# Patient Record
Sex: MALE | Race: WHITE | NOT HISPANIC OR LATINO | Employment: FULL TIME | URBAN - METROPOLITAN AREA
[De-identification: names, ages, dates, MRNs, and addresses within clinical notes are randomized per-mention and may not be internally consistent; named-entity substitution may affect disease eponyms.]

---
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
## 2019-01-11 ENCOUNTER — OFFICE VISIT (OUTPATIENT)
Dept: PEDIATRICS | Facility: CLINIC | Age: 19
End: 2019-01-11
Payer: COMMERCIAL

## 2019-01-11 VITALS
HEART RATE: 88 BPM | TEMPERATURE: 98.4 F | DIASTOLIC BLOOD PRESSURE: 62 MMHG | OXYGEN SATURATION: 98 % | BODY MASS INDEX: 21.82 KG/M2 | RESPIRATION RATE: 13 BRPM | SYSTOLIC BLOOD PRESSURE: 118 MMHG | WEIGHT: 184 LBS

## 2019-01-11 DIAGNOSIS — L24.9 IRRITANT CONTACT DERMATITIS, UNSPECIFIED TRIGGER: Primary | ICD-10-CM

## 2019-01-11 PROCEDURE — 99213 OFFICE O/P EST LOW 20 MIN: CPT | Performed by: PHYSICIAN ASSISTANT

## 2019-01-11 RX ORDER — TRIAMCINOLONE ACETONIDE 1 MG/G
CREAM TOPICAL
Qty: 30 G | Refills: 1 | Status: SHIPPED | OUTPATIENT
Start: 2019-01-11 | End: 2024-07-16

## 2019-01-11 ASSESSMENT — ENCOUNTER SYMPTOMS
VOMITING: 0
HEADACHES: 0
NAUSEA: 0
CHILLS: 0
SHORTNESS OF BREATH: 0
FOCAL WEAKNESS: 0
DIARRHEA: 0
ABDOMINAL PAIN: 0
FEVER: 0

## 2019-01-11 NOTE — PATIENT INSTRUCTIONS
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 Your skin may be red, swollen, dry, and may be cracked. Blisters may form and ooze. The rash will itch.  Contact dermatitis can form on the face and neck, backs of hands, forearms, genitals, and lower legs.  People can get contact dermatitis from lots of sources. These include:    Plants such as poison ivy, oak, or sumac    Chemicals in hair dyes and rinses, soaps, solvents, waxes, fingernail polish, and deodorants     Jewelry or watchbands made of nickel  Contact dermatitis is not passed from person to person.  Talk with your healthcare provider about what may have caused the rash. A type of allergy testing called \"patch testing\" may be used to discover what you are allergic to. You will need to avoid the source of your rash in the future to prevent it from coming back.  Treatment is done to relieve itching and prevent the rash from coming back. The rash should go away in a few days to a few weeks.  Home care  Your healthcare provider may prescribe medicine to relieve swelling and itching. Follow all instructions when using these medicines.  General care:    Avoid anything that heats up your skin, such as hot showers or baths, or direct sunlight. This can make itching worse.    Apply cold compresses to soothe your sores to help relieve your symptoms. Do this for 30 minutes 3 to 4 times a day. You can make a cold compress by soaking a cloth in cold water. Squeeze out excess water. You can add colloidal oatmeal to the water to help reduce itching. For severe itching in a small area, apply an ice pack wrapped in a thin towel. Do this for 20 minutes 3 to 4 times a day.    You can also try wet dressings. One way to do this is to " wear a wet piece of clothing under a dry one. Wear a damp shirt under a dry shirt if your upper body is affected. This can relieve itching and prevent you from scratching the affected area.    You can also help relieve large areas of itching by taking a lukewarm bath with colloidal oatmeal added to the water.    Use hydrocortisone cream for redness and irritation, unless another medicine was prescribed. You can also use benzocaine anesthetic cream or spray. Calamine lotion can also relieve mild symptoms.    Use oral diphenhydramine to help reduce itching. You can buy this antihistamine at drug and grocery stores. It can make you sleepy, so use lower doses during the daytime. Or you can use loratadine. This is an antihistamine that will not make you sleepy. Do not use diphenhydramine if you have glaucoma or have trouble urinating due to an enlarged prostate.    If a plant causes your rash, make sure to wash your skin and the clothes you were wearing when you came into contact with the plant. This is to wash away the plant oils that gave you the rash and prevent more or worse symptoms.    Stay away from the substance or object that causes your symptoms. If you can t avoid it, wear gloves or some other type of protection.  Follow-up care  Follow up with your healthcare provider, or as advised.  When to seek medical advice  Call your healthcare provider right away if any of these occur:    Spreading of the rash to other parts of your body    Severe swelling of your face, eyelids, mouth, throat or tongue    Trouble urinating due to swelling in the genital area    Fever of 100.4 F (38 C) or higher    Redness or swelling that gets worse    Pain that gets worse    Foul-smelling fluid leaking from the skin    Yellow-brown crusts on the open blisters  Date Last Reviewed: 9/1/2016 2000-2018 [employer]. [address] [place] [address], [place] [address]. All rights reserved. This information is not intended as a  substitute for professional medical care. Always follow your healthcare professional's instructions.

## 2019-01-11 NOTE — PROGRESS NOTES
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      Chart Review:  No flowsheet data found.  No flowsheet data found.    Patient Active Problem List   Diagnosis     Acne vulgaris     Encounter for therapeutic drug monitoring     Acne scar     Attention-deficit hyperactivity disorder, other type     Post-inflammatory pigmentary changes     Intrinsic atopic dermatitis     Exotropia of right eye     Past Surgical History:   Procedure Laterality Date     STRABISMUS SURGERY       Family History   Problem Relation Age of Onset     Family History Negative Mother      Family History Negative Father      Glaucoma No family hx of      Macular Degeneration No family hx of       Social History     Tobacco Use     Smoking status: Never Smoker     Smokeless tobacco: Never Used   Substance Use Topics     Alcohol use: No        Problem list, Medication list, Allergies, Medical/Social/Surg hx reviewed in [facility], updated as appropriate.      Review of Systems   Constitutional: Negative for chills and fever.   Respiratory: Negative for shortness of breath.    Cardiovascular: Negative for chest pain.   Gastrointestinal: Negative for abdominal pain, diarrhea, nausea and vomiting.   Skin: Positive for rash.   Neurological: Negative for focal weakness and headaches.   All other systems reviewed and are negative.        Physical Exam   Constitutional: He is  oriented to person, place, and time.   HENT:   Head: Normocephalic and atraumatic.   Cardiovascular: Normal rate, regular rhythm and normal heart sounds.   Pulmonary/Chest: Effort normal and breath sounds normal.   Musculoskeletal: Normal range of motion.   Neurological: He is alert and oriented to person, place, and time.   Skin: Skin is warm and dry. Rash (peeling, dry, erythematous skin to bilateral palm; also erythema & dryness to bilateral anterior elbows) noted.   Nursing note and vitals reviewed.    Vital Signs  /62 (BP Location: Right arm, Patient Position: Chair, Cuff Size: Adult Regular)   Pulse 88   Temp 98.4  F (36.9  C) (Tympanic)   Resp 13   Wt 83.5 kg (184 lb)   SpO2 98%   BMI 21.82 kg/m     Body mass index is 21.82 kg/m .    Diagnostic Test Results:  none     ASSESSMENT/PLAN:                                                        ICD-10-CM    1. Irritant contact dermatitis, unspecified trigger L24.9 triamcinolone (KENALOG) 0.1 % external cream   Rx triamcinolone, use bid until better; discussed use of thick emollient as well- continue once better. 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    I have discussed any lab or imaging results, the patient's diagnosis, and my plan of treatment with the patient and/or family. Patient is aware to come back in if with worsening symptoms or if no relief despite treatment plan.  Patient voiced understanding and had no further questions.       Follow Up: Return in about 2 weeks (around 1/25/2019) for Follow up w/ PCP if not better.    SANTINO Rain, PAJackyC  [facility]

## 2019-01-11 NOTE — PROGRESS NOTES
"  SUBJECTIVE:   Fernando Phan is a 18 year old male who presents to clinic today for the following health issues:    Rash    Duration: about 3 weeks    Description  Location: bilateral hands, going up arms  Itching: mild    Intensity:  moderate    Accompanying signs and symptoms: skin peeling off hands    History (similar episodes/previous evaluation): yes    Precipitating or alleviating factors:  New exposures:  None  Recent travel: no      Therapies tried and outcome: \"lotion\"      {additional problems for provider to add:390043}    Problem list and histories reviewed & adjusted, as indicated.  Additional history: {NONE - AS DOCUMENTED:012862::\"as documented\"}    {HIST REVIEW/ LINKS 2:990000}    Reviewed and updated as needed this visit by clinical staff       Reviewed and updated as needed this visit by Provider         {PROVIDER CHARTING PREFERENCE:550037}  "

## 2021-02-12 ENCOUNTER — OFFICE VISIT (OUTPATIENT)
Dept: OPTOMETRY | Facility: CLINIC | Age: 21
End: 2021-02-12
Payer: COMMERCIAL

## 2021-02-12 DIAGNOSIS — H52.13 MYOPIA OF BOTH EYES WITH ASTIGMATISM: Primary | ICD-10-CM

## 2021-02-12 DIAGNOSIS — H50.111 EXOTROPIA OF RIGHT EYE: ICD-10-CM

## 2021-02-12 DIAGNOSIS — H52.203 MYOPIA OF BOTH EYES WITH ASTIGMATISM: Primary | ICD-10-CM

## 2021-02-12 PROCEDURE — 92015 DETERMINE REFRACTIVE STATE: CPT | Performed by: OPTOMETRIST

## 2021-02-12 PROCEDURE — 92004 COMPRE OPH EXAM NEW PT 1/>: CPT | Performed by: OPTOMETRIST

## 2021-02-12 ASSESSMENT — REFRACTION_WEARINGRX
OD_CYLINDER: +0.25
OD_SPHERE: -1.75
OS_CYLINDER: SPHERE
OD_AXIS: 172
OS_SPHERE: -2.50
SPECS_TYPE: SVL

## 2021-02-12 ASSESSMENT — VISUAL ACUITY
OS_CC: 20/20
OS_CC+: -2
OD_SC: 20/40
CORRECTION_TYPE: GLASSES
OS_CC: 20/20
METHOD: SNELLEN - LINEAR
OD_CC: 20/25
OD_SC+: -1
OD_CC: 20/20
OS_SC: 20/200

## 2021-02-12 ASSESSMENT — REFRACTION_MANIFEST
METHOD_AUTOREFRACTION: 1
OS_AXIS: 016
OD_AXIS: 172
OD_SPHERE: -1.00
OS_SPHERE: -2.50
OS_SPHERE: -2.75
OD_CYLINDER: +0.50
OS_CYLINDER: SPHERE
OD_SPHERE: -2.25
OD_AXIS: 161
OD_CYLINDER: +0.75
OS_CYLINDER: +0.25

## 2021-02-12 ASSESSMENT — CONF VISUAL FIELD
METHOD: COUNTING FINGERS
OS_NORMAL: 1
OD_NORMAL: 1

## 2021-02-12 ASSESSMENT — CUP TO DISC RATIO
OS_RATIO: 0.25
OD_RATIO: 0.2

## 2021-02-12 ASSESSMENT — EXTERNAL EXAM - LEFT EYE: OS_EXAM: NORMAL

## 2021-02-12 ASSESSMENT — TONOMETRY
IOP_METHOD: APPLANATION
OD_IOP_MMHG: 17
OS_IOP_MMHG: 16

## 2021-02-12 ASSESSMENT — SLIT LAMP EXAM - LIDS
COMMENTS: NORMAL
COMMENTS: NORMAL

## 2021-02-12 ASSESSMENT — EXTERNAL EXAM - RIGHT EYE: OD_EXAM: NORMAL

## 2021-02-12 NOTE — LETTER
2/12/2021         RE: Fernando Phan  [address] [place] [address]        Dear Colleague,    Thank you for referring your patient, Fernando Phan, to the [facility] [place]. Please see a copy of my visit note below.    Chief Complaint   Patient presents with     Annual Eye Exam      Blur at distance  Last Eye Exam: 2017  Dilated Previously: Yes. Signs and symptoms of dilation were discussed. Patient consents to dilation today.    What are you currently using to see?  glasses       Distance Vision Acuity: Noticed gradual change in both eyes    Near Vision Acuity: Satisfied with vision while reading and using computer with glasses    Eye Comfort: good  Do you use eye drops? : No    Heather Kaye CPO          Medical, surgical and family histories reviewed and updated 2/12/2021.       OBJECTIVE: See Ophthalmology exam    ASSESSMENT:    ICD-10-CM    1. Myopia of both eyes with astigmatism  H52.13 REFRACTION    H52.203    2. Exotropia of right eye  H50.10     large angle some alternating predominantly R      PLAN:   Update prescription   Offered strabismus referral if he would like    Sue Grijalva OD       Again, thank you for allowing me to participate in the care of your patient.        Sincerely,        Sue Grijalva, OD

## 2021-02-12 NOTE — PROGRESS NOTES
Chief Complaint   Patient presents with     Annual Eye Exam      Blur at distance  Last Eye Exam: 2017  Dilated Previously: Yes. Signs and symptoms of dilation were discussed. Patient consents to dilation today.    What are you currently using to see?  glasses       Distance Vision Acuity: Noticed gradual change in both eyes    Near Vision Acuity: Satisfied with vision while reading and using computer with glasses    Eye Comfort: good  Do you use eye drops? : No    Heather Kaye CPO          Medical, surgical and family histories reviewed and updated 2/12/2021.       OBJECTIVE: See Ophthalmology exam    ASSESSMENT:    ICD-10-CM    1. Myopia of both eyes with astigmatism  H52.13 REFRACTION    H52.203    2. Exotropia of right eye  H50.10     large angle some alternating predominantly R      PLAN:   Update prescription   Offered strabismus referral if he would like    Sue Grijalva OD

## 2021-08-01 ENCOUNTER — OFFICE VISIT (OUTPATIENT)
Dept: URGENT CARE | Facility: URGENT CARE | Age: 21
End: 2021-08-01
Payer: COMMERCIAL

## 2021-08-01 VITALS
TEMPERATURE: 99.8 F | HEART RATE: 108 BPM | DIASTOLIC BLOOD PRESSURE: 62 MMHG | SYSTOLIC BLOOD PRESSURE: 130 MMHG | OXYGEN SATURATION: 98 %

## 2021-08-01 DIAGNOSIS — L03.115 CELLULITIS OF RIGHT LEG: Primary | ICD-10-CM

## 2021-08-01 DIAGNOSIS — T63.481A INSECT STINGS, ACCIDENTAL OR UNINTENTIONAL, INITIAL ENCOUNTER: ICD-10-CM

## 2021-08-01 DIAGNOSIS — T78.40XA ALLERGIC REACTION, INITIAL ENCOUNTER: ICD-10-CM

## 2021-08-01 PROCEDURE — 99214 OFFICE O/P EST MOD 30 MIN: CPT | Performed by: FAMILY MEDICINE

## 2021-08-01 RX ORDER — CEPHALEXIN 500 MG/1
500 CAPSULE ORAL 3 TIMES DAILY
Qty: 21 CAPSULE | Refills: 0 | Status: SHIPPED | OUTPATIENT
Start: 2021-08-01 | End: 2021-08-08

## 2021-08-01 RX ORDER — PREDNISONE 10 MG/1
20 TABLET ORAL DAILY
Qty: 10 TABLET | Refills: 0 | Status: SHIPPED | OUTPATIENT
Start: 2021-08-01 | End: 2021-08-06

## 2021-08-01 NOTE — PROGRESS NOTES
Fernando Phan is a 21 year old male who comes in today for sting    Not sure if bee     Happened Friday 2 days ago    Right away a little red dot    The next am pink around area    That night ( last night ) got worse    Worse yet today        Full physical not done     Mentation and affect are fine    No tremor of speech or extremity    Lungs clear all fields    No resp distress    Heart regular     Throat normal appearance    On right lower leg patient had an area of redness lower anterior left leg  In middle of this is a tiny white area (possible pustule) but when we squeezed this only got a tiny bit of clear fluid out    No significant abscess    He also has two blisters inferior and medial to the primary area    There is a background of possible cellullitis  / erythema superiorly    Distally on ankle is some blanchable erythema; appears to be more swelling/ hyperemia rather than namrata cellulitis theree    Good sensation  And range of motion and strength in foot/ ankle    Knee is f ine      ASSESSMENT / PLAN:  (L03.115) Cellulitis of right leg  (primary encounter diagnosis)  Comment: cover with antibiotics   Plan: cephALEXin (KEFLEX) 500 MG capsule             (T78.40XA) Allergic reaction, initial encounter  Comment: prudent to cover for allergic reaction to sting also   Plan: predniSONE (DELTASONE) 10 MG tablet             (T63.481A) Insect stings, accidental or unintentional, initial encounter  Comment: as above   Plan: as above    Follow up as needed based on symptoms     Be seen promptly if symptoms acutely worsen       I reviewed the patient's medications, allergies, medical history, family history, and social history.    Abad Galeana MD

## 2021-08-01 NOTE — PATIENT INSTRUCTIONS
Start antibiotics and prednisone    Monitor symptoms    To emergency room if worsening    Follow up in clinic as needed

## 2023-07-12 ENCOUNTER — OFFICE VISIT (OUTPATIENT)
Dept: OPHTHALMOLOGY | Facility: CLINIC | Age: 23
End: 2023-07-12
Payer: COMMERCIAL

## 2023-07-12 DIAGNOSIS — H50.10 EXOTROPIA: Primary | ICD-10-CM

## 2023-07-12 DIAGNOSIS — H52.223 REGULAR ASTIGMATISM OF BOTH EYES: ICD-10-CM

## 2023-07-12 DIAGNOSIS — H52.13 MYOPIA OF BOTH EYES: ICD-10-CM

## 2023-07-12 PROCEDURE — 92014 COMPRE OPH EXAM EST PT 1/>: CPT | Performed by: STUDENT IN AN ORGANIZED HEALTH CARE EDUCATION/TRAINING PROGRAM

## 2023-07-12 PROCEDURE — 92015 DETERMINE REFRACTIVE STATE: CPT | Performed by: STUDENT IN AN ORGANIZED HEALTH CARE EDUCATION/TRAINING PROGRAM

## 2023-07-12 ASSESSMENT — CONF VISUAL FIELD
OS_NORMAL: 1
OS_SUPERIOR_NASAL_RESTRICTION: 0
OD_INFERIOR_TEMPORAL_RESTRICTION: 0
OS_INFERIOR_TEMPORAL_RESTRICTION: 0
OD_SUPERIOR_TEMPORAL_RESTRICTION: 0
OD_NORMAL: 1
OD_SUPERIOR_NASAL_RESTRICTION: 0
OD_INFERIOR_NASAL_RESTRICTION: 0
OS_SUPERIOR_TEMPORAL_RESTRICTION: 0
OS_INFERIOR_NASAL_RESTRICTION: 0

## 2023-07-12 ASSESSMENT — REFRACTION_MANIFEST
OS_CYLINDER: SPHERE
OD_SPHERE: -2.25
OS_SPHERE: -2.50
OD_AXIS: 162
OD_CYLINDER: +1.00

## 2023-07-12 ASSESSMENT — VISUAL ACUITY
METHOD: SNELLEN - LINEAR
CORRECTION_TYPE: GLASSES
OS_CC: J1+
OD_CC: J1+
OD_CC+: -2
OS_CC: 20/20
OD_CC: 20/20

## 2023-07-12 ASSESSMENT — REFRACTION_WEARINGRX
OS_SPHERE: -2.75
OD_AXIS: 173
OD_SPHERE: -2.50
OD_CYLINDER: +1.00
SPECS_TYPE: SVL
OS_CYLINDER: SPHERE

## 2023-07-12 ASSESSMENT — CUP TO DISC RATIO
OD_RATIO: 0.2
OS_RATIO: 0.25

## 2023-07-12 ASSESSMENT — SLIT LAMP EXAM - LIDS
COMMENTS: NORMAL
COMMENTS: NORMAL

## 2023-07-12 ASSESSMENT — EXTERNAL EXAM - LEFT EYE: OS_EXAM: NORMAL

## 2023-07-12 ASSESSMENT — EXTERNAL EXAM - RIGHT EYE: OD_EXAM: NORMAL

## 2023-07-12 ASSESSMENT — TONOMETRY
IOP_METHOD: APPLANATION
OD_IOP_MMHG: 18
OS_IOP_MMHG: 17

## 2023-07-12 NOTE — PROGRESS NOTES
Current Eye Medications: none     Subjective:  Complete exam: patient is happy with his vision. His glasses broke 1 week ago, and was glued, however when I went to read and clean his glasses, they broke again. Optical dept. was unable to glue or tape them.    *pt drove himself to clinic today, was able to tape pt's gls together.  History of strabismus surgery left eye.      Objective:  See Ophthalmology Exam.       Assessment:  Fernando Phan is a 23 year old male who presents with:   Encounter Diagnoses   Name Primary?     Exotropia s/p strabismus surgery left eye Yes     Myopia of both eyes      Regular astigmatism of both eyes      Plan:  Glasses prescription given     Shelley Robbins MD  [phone]

## 2023-07-12 NOTE — LETTER
7/12/2023         RE: Fernando Phan  [address] [place] [address]        Dear Colleague,    Thank you for referring your patient, Fernando Phan, to the [facility]. Please see a copy of my visit note below.     Current Eye Medications: none     Subjective:  Complete exam: patient is happy with his vision. His glasses broke 1 week ago, and was glued, however when I went to read and clean his glasses, they broke again. Optical dept. was unable to glue or tape them.    *pt drove himself to clinic today, was able to tape pt's gls together.  History of strabismus surgery left eye.      Objective:  See Ophthalmology Exam.       Assessment:  Fernando Phan is a 23 year old male who presents with:   Encounter Diagnoses   Name Primary?     Exotropia s/p strabismus surgery left eye Yes     Myopia of both eyes      Regular astigmatism of both eyes      Plan:  Glasses prescription given     Shelley Robbins MD  [phone]          Again, thank you for allowing me to participate in the care of your patient.        Sincerely,        Shelley Robbins MD

## 2024-07-16 ENCOUNTER — OFFICE VISIT (OUTPATIENT)
Dept: URGENT CARE | Facility: URGENT CARE | Age: 24
End: 2024-07-16
Payer: COMMERCIAL

## 2024-07-16 VITALS
TEMPERATURE: 98.6 F | BODY MASS INDEX: 25.95 KG/M2 | OXYGEN SATURATION: 98 % | HEART RATE: 91 BPM | SYSTOLIC BLOOD PRESSURE: 114 MMHG | WEIGHT: 218.8 LBS | DIASTOLIC BLOOD PRESSURE: 79 MMHG

## 2024-07-16 DIAGNOSIS — H60.393 INFECTIVE OTITIS EXTERNA, BILATERAL: Primary | ICD-10-CM

## 2024-07-16 PROCEDURE — 99213 OFFICE O/P EST LOW 20 MIN: CPT | Performed by: PHYSICIAN ASSISTANT

## 2024-07-16 RX ORDER — NEOMYCIN SULFATE, POLYMYXIN B SULFATE AND HYDROCORTISONE 10; 3.5; 1 MG/ML; MG/ML; [USP'U]/ML
3 SUSPENSION/ DROPS AURICULAR (OTIC) 4 TIMES DAILY
Qty: 10 ML | Refills: 0 | Status: SHIPPED | OUTPATIENT
Start: 2024-07-16 | End: 2024-07-23

## 2024-07-16 NOTE — PATIENT INSTRUCTIONS
July 16, 2024 Urgent Care Plan:       1. Antibiotic ear drops as per above   2. Temporarily avoid ear buds. Consider stopping use of Q-Tips.  3. Follow-up with primary care provider team if not improving after 3 days of antibiotics, if not resolved in 1 week, and sooner if any sudden worsening.

## 2024-07-16 NOTE — PROGRESS NOTES
ASSESSMENT/PLAN:    (H60.393) Infective otitis externa, bilateral  (primary encounter diagnosis)  Plan: neomycin-polymyxin-hydrocortisone (CORTISPORIN)        [id number] otic suspension              July 16, 2024 Urgent Care Plan:       1. Antibiotic ear drops as per above   2. Temporarily avoid ear buds. Consider stopping use of Q-Tips.  3. Follow-up with primary care provider team if not improving after 3 days of antibiotics, if not resolved in 1 week, and sooner if any sudden worsening.     This progress note has been dictated, with use of voice recognition software. Any grammatical, typographical, or context errors are unintentional and inherent to use of voice recognition software.  ------------------      Chief Complaint   Patient presents with    Urgent Care     Pt presents with pain in both ears onset yesterday.         SUBJECTIVE:    Fernando Phan presents to  today for evaluation of bilateral ear pain x 2 duration, with interval worsening today.     Positive for use of Q-Tips and Ear Buds. No swimming recently     ROS: No associated fever or chills. No acute nasal/sinus congestion. No acute purulent or bloody drainage from ears. No acute swelling, heat or redness around ear. No acute facial or lateral neck swelling. No acute sore throat or difficulty swallowing. No acute chest pain or shortness of breath. No acute headache or neck pain/stiffness.     Past Medical History:   Diagnosis Date    Strabismus        Patient Active Problem List   Diagnosis    Acne vulgaris    Encounter for therapeutic drug monitoring    Acne scar    Attention-deficit hyperactivity disorder, other type    Post-inflammatory pigmentary changes    Intrinsic atopic dermatitis    Exotropia of right eye     No current outpatient medications on file.     No current facility-administered medications for this visit.           No Known Allergies      OBJECTIVE:   /79   Pulse 91   Temp 98.6  F (37  C) (Tympanic)   Wt 99.2 kg  (218 lb 12.8 oz)   SpO2 98%   BMI 25.95 kg/m          General appearance: alert and no apparent distress  Skin color is uniform in color and without rash.  HEENT:   Left external canal is mildly erythematous and mildly edematous. Small amount of purulent debris present. Mild discomfort with manipulation of tragus today. No nicolas auricular swelling. No lateral neck or face swelling.   Right external canal is mildly erythematous and mildly edematous. Small amount of purulent debris present. Mild discomfort with manipulation of tragus today. No nicolas auricular swelling. No lateral neck or face swelling. TM is normal: no effusions, no erythema, and normal landmarks.  Nasal mucosa is normal.  Oropharyngeal exam is normal: no lesions, erythema, adenopathy or exudate.  Neck is supple, FROM with no adenopathy  CARDIAC:NORMAL - regular rate and rhythm without murmur.  RESP: Normal - CTA without rales, rhonchi, or wheezing.  NEURO: Alert and oriented.  Normal speech and mentation.  CN II/XII grossly intact.  Gait within normal limits.

## 2024-08-31 ENCOUNTER — HEALTH MAINTENANCE LETTER (OUTPATIENT)
Age: 24
End: 2024-08-31